# Patient Record
Sex: FEMALE | Race: AMERICAN INDIAN OR ALASKA NATIVE
[De-identification: names, ages, dates, MRNs, and addresses within clinical notes are randomized per-mention and may not be internally consistent; named-entity substitution may affect disease eponyms.]

---

## 2019-01-14 ENCOUNTER — HOSPITAL ENCOUNTER (EMERGENCY)
Dept: HOSPITAL 5 - ED | Age: 59
Discharge: HOME | End: 2019-01-14
Payer: SELF-PAY

## 2019-01-14 VITALS — SYSTOLIC BLOOD PRESSURE: 126 MMHG | DIASTOLIC BLOOD PRESSURE: 83 MMHG

## 2019-01-14 DIAGNOSIS — M62.830: Primary | ICD-10-CM

## 2019-01-14 DIAGNOSIS — I10: ICD-10-CM

## 2019-01-14 DIAGNOSIS — F17.200: ICD-10-CM

## 2019-01-14 PROCEDURE — 99282 EMERGENCY DEPT VISIT SF MDM: CPT

## 2019-01-14 PROCEDURE — 96372 THER/PROPH/DIAG INJ SC/IM: CPT

## 2019-01-14 NOTE — EMERGENCY DEPARTMENT REPORT
ED Back Pain/Injury HPI





- General


Chief Complaint: Back Pain/Injury


Stated Complaint: HURT LOWER BACK


Time Seen by Provider: 01/14/19 16:41


Source: patient


Limitations: No Limitations





- History of Present Illness


Initial Comments: 





Patient is a 58-year-old -American female who comes to the ER after 

hurting her back while lifting a heavy object.  She states that she did not use 

her leg she bent over and her lower back and twisted as she turned.  She has 

left paraspinal spasm.  She is ambulatory but with pain.  She has no signs or 

symptoms of cauda equina.





- Related Data


                                  Previous Rx's











 Medication  Instructions  Recorded  Last Taken  Type


 


Cyclobenzaprine [Flexeril] 10 mg PO TID PRN #10 tablet 01/14/19 Unknown Rx


 


hydroCHLOROthiazide [HCTZ] 25 mg PO QDAY #30 tablet 01/14/19 Unknown Rx


 


predniSONE [Deltasone] 20 mg PO DAILY #5 tablet 01/14/19 Unknown Rx


 


traMADol [Ultram] 50 mg PO Q6HR PRN #10 tablet 01/14/19 Unknown Rx











                                    Allergies











Allergy/AdvReac Type Severity Reaction Status Date / Time


 


No Known Allergies Allergy   Verified 11/28/15 00:49














ED Review of Systems


ROS: 


Stated complaint: HURT LOWER BACK


Other details as noted in HPI





Comment: All other systems reviewed and negative


Constitutional: denies: chills


ENT: denies: throat pain


Respiratory: denies: cough


Cardiovascular: denies: dyspnea on exertion


Endocrine: denies: flushing


Genitourinary: denies: urgency


Musculoskeletal: as per HPI, back pain


Skin: denies: rash


Neurological: denies: weakness


Psychiatric: denies: depression


Hematological/Lymphatic: denies: easy bleeding





ED Past Medical Hx





- Past Medical History


Previous Medical History?: Yes


Hx Hypertension: Yes





- Surgical History


Past Surgical History?: No





- Social History


Smoking Status: Current Every Day Smoker


Substance Use Type: None





- Medications


Home Medications: 


                                Home Medications











 Medication  Instructions  Recorded  Confirmed  Last Taken  Type


 


Cyclobenzaprine [Flexeril] 10 mg PO TID PRN #10 tablet 01/14/19  Unknown Rx


 


hydroCHLOROthiazide [HCTZ] 25 mg PO QDAY #30 tablet 01/14/19  Unknown Rx


 


predniSONE [Deltasone] 20 mg PO DAILY #5 tablet 01/14/19  Unknown Rx


 


traMADol [Ultram] 50 mg PO Q6HR PRN #10 tablet 01/14/19  Unknown Rx














ED Physical Exam





- General


Limitations: No Limitations


General appearance: alert





- Head


Head exam: Present: atraumatic





- Eye


Eye exam: Present: normal appearance





- ENT


ENT exam: Present: normal exam





- Neck


Neck exam: Present: normal inspection





- Respiratory


Respiratory exam: Present: normal lung sounds bilaterally





- Cardiovascular


Cardiovascular Exam: Present: regular rate





- GI/Abdominal


GI/Abdominal exam: Present: soft





- Rectal


Rectal exam: Present: deferred





- Extremities Exam


Extremities exam: Present: normal inspection, full ROM





- Back Exam


Back exam: Present: full ROM, muscle spasm (ordered with pain left paraspinal). 

 Absent: CVA tenderness (R), CVA tenderness (L), paraspinal tenderness, 

vertebral tenderness





- Neurological Exam


Neurological exam: Present: alert, oriented X3





- Psychiatric


Psychiatric exam: Present: normal affect, normal mood





- Skin


Skin exam: Present: warm, dry, intact





ED Course


                                   Vital Signs











  01/14/19 01/14/19





  14:18 17:10


 


Temperature 98.6 F 


 


Pulse Rate 92 H 101 H


 


Respiratory 20 





Rate  


 


Blood Pressure 156/106 126/83


 


O2 Sat by Pulse 90 





Oximetry  














ED Medical Decision Making





- Medical Decision Making





Patient did not fall she has no trauma that would result in a fracture of her 

vertebrae.  She has no previous back injury or history that would make me 

suspicious for fracture or dislocation.  She does not have severe osteoporosis 

that would lend itself to a pathological fracture.  She is neurovascularly 

intact.  She was medicated in the emergency room for pain and discharged with 

additional medications to alleviate her spasm.  She's been given follow-up with 

orthopedics.





Patient states that she ran out of her blood pressure medication months ago and 

quit taking it.  She was taking lisinopril one point but it made her cough.  

Given her elevated blood pressure I sent her home with hydrochlorothiazide and 

PCP referral.  She was also medicated with clonidine here in the ER.





- Differential Diagnosis


muscle injury


Critical care attestation.: 


If time is entered above; I have spent that time in minutes in the direct care 

of this critically ill patient, excluding procedure time.








ED Disposition


Clinical Impression: 


 Back spasm, HTN (hypertension), Medical non-compliance





Disposition: DC-01 TO HOME OR SELFCARE


Is pt being admited?: No


Does the pt Need Aspirin: No


Condition: Stable


Instructions:  Hypertension (ED), Muscle Spasm (ED)


Additional Instructions: 


WARM COMPRESSES





MEDS AS ORDERED 


MOTRIN OR TYLENOL FOR MILD PAIN





GOOD BODY MECHANICS





FOLLOW UP PCP IF PERSISTS





LOW SALT DIET





ACTIVITY AS TOLERATED





Prescriptions: 


Cyclobenzaprine [Flexeril] 10 mg PO TID PRN #10 tablet


 PRN Reason: Muscle Spasm


hydroCHLOROthiazide [HCTZ] 25 mg PO QDAY #30 tablet


predniSONE [Deltasone] 20 mg PO DAILY #5 tablet


traMADol [Ultram] 50 mg PO Q6HR PRN #10 tablet


 PRN Reason: Pain


Referrals: 


PRIMARY CARE,MD [Primary Care Provider] - 3-5 Days


KISHORE BRUNNER MD [Referring] - 3-5 Days


Forms:  Work/School Release Form(ED)


Time of Disposition: 16:45

## 2022-04-18 ENCOUNTER — HOSPITAL ENCOUNTER (EMERGENCY)
Dept: HOSPITAL 5 - ED | Age: 62
LOS: 1 days | Discharge: HOME | End: 2022-04-19
Payer: SELF-PAY

## 2022-04-18 DIAGNOSIS — F17.211: ICD-10-CM

## 2022-04-18 DIAGNOSIS — R04.0: Primary | ICD-10-CM

## 2022-04-18 DIAGNOSIS — I10: ICD-10-CM

## 2022-04-18 DIAGNOSIS — F17.200: ICD-10-CM

## 2022-04-18 DIAGNOSIS — Z91.09: ICD-10-CM

## 2022-04-18 PROCEDURE — 99283 EMERGENCY DEPT VISIT LOW MDM: CPT

## 2022-04-18 NOTE — EMERGENCY DEPARTMENT REPORT
ED General Adult HPI





- General


Chief complaint: Nosebleed


Stated complaint: NOSE BLEED


Time Seen by Provider: 04/18/22 22:43


Source: patient, EMS ( EMS documentation not available at time of chart 

dictation ), RN notes reviewed, old records reviewed


Mode of arrival: Ambulatory


Limitations: No Limitations





- History of Present Illness


Initial comments: 





This patient is a 61-year-old female who is a chronic tobacco user, who presents

to the ER today with a complaint of painless nasal bleeding.  It started earlier

on today.  This is happened to her in the past.  She denies trauma, nasal 

picking, cocaine use.





She does not use systemic anticoagulation.  She endorses no additional injuries 

or complaints.  Her symptoms were resolved in the emergency room with 

administration of Afrin, direct digital pressure with a tongue depressor device.








-: Sudden


Consistency: constant, now resolved


Improves with: other (As per history of present illness)


Associated Symptoms: denies other symptoms





- Related Data


                                  Previous Rx's











 Medication  Instructions  Recorded  Last Taken  Type


 


hydroCHLOROthiazide [HCTZ] 25 mg PO QDAY #30 tablet 01/14/19 Unknown Rx


 


predniSONE [Deltasone] 20 mg PO DAILY #5 tablet 01/14/19 Unknown Rx


 


Fluticasone [Flonase] 1 spray NS QDAY #1 bottle 04/19/22 Unknown Rx


 


Nicotine Polacrilex [Nicotine Gum] 4 mg BC PRN #1 pack 04/19/22 Unknown Rx











                                    Allergies











Allergy/AdvReac Type Severity Reaction Status Date / Time


 


aspirin AdvReac  Unknown Verified 04/18/22 22:54














ED Review of Systems


ROS: 


Stated complaint: NOSE BLEED


Other details as noted in HPI





Constitutional: denies: fever


Eyes: denies: vision change


ENT: epistaxis, congestion


Respiratory: denies: cough


Cardiovascular: denies: chest pain


Gastrointestinal: denies: abdominal pain, melena, hematochezia


Hematological/Lymphatic: denies: easy bleeding





ED Past Medical Hx





- Past Medical History


Hx Hypertension: Yes





- Social History


Smoking Status: Current Every Day Smoker


Substance Use Type: None





- Medications


Home Medications: 


                                Home Medications











 Medication  Instructions  Recorded  Confirmed  Last Taken  Type


 


hydroCHLOROthiazide [HCTZ] 25 mg PO QDAY #30 tablet 01/14/19  Unknown Rx


 


predniSONE [Deltasone] 20 mg PO DAILY #5 tablet 01/14/19  Unknown Rx


 


Fluticasone [Flonase] 1 spray NS QDAY #1 bottle 04/19/22  Unknown Rx


 


Nicotine Polacrilex [Nicotine Gum] 4 mg BC PRN #1 pack 04/19/22  Unknown Rx














ED Physical Exam





- General


Limitations: No Limitations


General appearance: alert, anxious





- Head


Head exam: Present: atraumatic, normocephalic





- Eye


Eye exam: Present: normal appearance, EOMI.  Absent: nystagmus





- ENT


ENT exam: Present: normal exam, normal orophraynx, mucous membranes moist, 

normal external ear exam, other (There is dried blood noted in the right 

nostril.  There is no active bleeding.  There is no nasal septal hematoma.)





- Neck


Neck exam: Present: normal inspection, full ROM.  Absent: tenderness, 

meningismus





- Respiratory


Respiratory exam: Present: normal lung sounds bilaterally.  Absent: respiratory 

distress, wheezes, rales, rhonchi, stridor, decreased breath sounds





- Cardiovascular


Cardiovascular Exam: Present: regular rate, normal rhythm, normal heart sounds. 

Absent: bradycardia, tachycardia, irregular rhythm, systolic murmur, diastolic 

murmur, rubs, gallop





- GI/Abdominal


GI/Abdominal exam: Present: soft.  Absent: distended, tenderness, guarding, 

rebound, rigid, pulsatile mass





- Extremities Exam


Extremities exam: Present: normal inspection, full ROM, other (2+ pulses noted 

in the bilateral upper and lower extremities.  There is no palpable cord.   

negative Homans sign.  Muscular compartments are soft.  The pelvis is stable.). 

Absent: pedal edema, calf tenderness





- Back Exam


Back exam: Present: normal inspection, full ROM.  Absent: tenderness, CVA 

tenderness (R), CVA tenderness (L), paraspinal tenderness, vertebral tenderness





- Neurological Exam


Neurological exam: Present: alert, oriented X3, normal gait, other (No facial 

droop.  Tongue midline.  Extraocular movements intact bilaterally.  Facial 

sensation intact to light touch in V1, V2, V3 distribution bilaterally.  5 and a

5 strength in 4 extremities.  Sensation intact to light touch in 4 

extremities.).  Absent: motor sensory deficit





- Psychiatric


Psychiatric exam: Present: normal affect, normal mood





- Skin


Skin exam: Present: warm, dry, intact, normal color.  Absent: rash





ED Course


                                   Vital Signs











  04/18/22 04/18/22 04/18/22





  22:51 23:01 23:15


 


Temperature   


 


Pulse Rate 87 88 85


 


Respiratory 14 21 21





Rate   


 


Blood Pressure  108/76 95/63


 


Blood Pressure 108/76  





[Right]   


 


O2 Sat by Pulse   





Oximetry   














  04/18/22 04/18/22 04/19/22





  23:31 23:45 00:01


 


Temperature   


 


Pulse Rate 85 91 H 84


 


Respiratory 14 22 16





Rate   


 


Blood Pressure 93/72 103/69 97/63


 


Blood Pressure   





[Right]   


 


O2 Sat by Pulse   





Oximetry   














  04/19/22





  00:09


 


Temperature 98.0 F


 


Pulse Rate 84


 


Respiratory 16





Rate 


 


Blood Pressure 


 


Blood Pressure 104/71





[Right] 


 


O2 Sat by Pulse 92





Oximetry 














ED Medical Decision Making





- Lab Data








                                   Vital Signs











  04/18/22 04/18/22 04/18/22





  22:51 23:01 23:15


 


Temperature   


 


Pulse Rate 87 88 85


 


Respiratory 14 21 21





Rate   


 


Blood Pressure  108/76 95/63


 


Blood Pressure 108/76  





[Right]   


 


O2 Sat by Pulse   





Oximetry   














  04/18/22 04/18/22 04/19/22





  23:31 23:45 00:01


 


Temperature   


 


Pulse Rate 85 91 H 84


 


Respiratory 14 22 16





Rate   


 


Blood Pressure 93/72 103/69 97/63


 


Blood Pressure   





[Right]   


 


O2 Sat by Pulse   





Oximetry   














  04/19/22





  00:09


 


Temperature 98.0 F


 


Pulse Rate 84


 


Respiratory 16





Rate 


 


Blood Pressure 


 


Blood Pressure 104/71





[Right] 


 


O2 Sat by Pulse 92





Oximetry 








Oxygen saturation reviewed and appreciated.  Patient has been hypoxic since 

2019.  This is most likely secondary to her underlying tobacco use.  She has no 

respiratory complaints or distress at this time





- Medical Decision Making





Differential diagnosis, including the not limited to: Nasal bleeding, epistaxis,

 chronic tobacco use, encounter for tobacco cessation





Assessment and plan: 61-year-old female, who is a chronic tobacco user and 

smoker, presenting to the ER with nasal bleeding which is now resolved.





Patient is counseled to discontinue tobacco smoking.





May be discharged with Afrin, Flonase, tongue depressor device, and nicotine 

gum.  Patient extensively counseled that nasal bleeding may recur, and if it 

does recur, she is instructed on how to care for it.  Patient observed in this 

ER for hours without clinical decompensation.  She is suitable for discharge at 

this point in time.  Return precautions are reviewed.  All questions answered


Critical care attestation.: 


If time is entered above; I have spent that time in minutes in the direct care 

of this critically ill patient, excluding procedure time.








ED Disposition


Clinical Impression: 


 Epistaxis, Encounter for tobacco use cessation counseling





Disposition: 01 HOME / SELF CARE / HOMELESS


Is pt being admited?: No


Does the pt Need Aspirin: No


Condition: Stable


Instructions:  Steps to Quit Smoking, Easy-to-Read, Nosebleed, Easy-to-Read


Additional Instructions: 


Recommend that patient discontinue tobacco consumption.  Long-term use of 

tobacco and cigarettes is a risk factor for cancer, stroke, heart attack.





Nasal bleeding has resolved at this point in time.  Tobacco smoking is likely 

contributing factor.  If nasal bleeding reoccurs, please apply Afrin spray in 

the affected nostril, 1 to 2 sprays, then hold direct digital pressure with the 

tongue depressor device that has been provided to the patient in the emergency 

room for about 20 minutes.  He should stop the bleeding.  If this does not stop 

the bleeding, please present to the emergency room right away.





Afrin may be used once every 12 hours for 3 days.  Afrin should not be used for 

more than 3 days consecutively, and should not be used more often than once 

every 12 hours.





For the next week, do not take Motrin, ibuprofen, Naprosyn, Aleve, or aspirin.





Recommend that the patient follow-up with a primary care doctor within the next 

month.





Please return to the emergency room right away with new pain, worsened pain, 

migration of pain, projectile vomiting, change in mental status, confusion, 

inability tolerate liquid feeds, new, worsened or different symptoms not present

 on the initial emergency room evaluation





Flonase may be used indefinitely.  Patient may also use an over-the-counter 

allergy medicine like Zyrtec, in case there is a component of allergies present.


Referrals: 


PRIMARY CARE,MD [Primary Care Provider] - 3-5 Days


[facility] [Provider Group] - 3-5 Days


Forms:  Work/School Release Form(ED)

## 2022-04-19 VITALS — DIASTOLIC BLOOD PRESSURE: 81 MMHG | SYSTOLIC BLOOD PRESSURE: 120 MMHG
